# Patient Record
Sex: MALE | Race: WHITE | Employment: UNEMPLOYED | URBAN - METROPOLITAN AREA
[De-identification: names, ages, dates, MRNs, and addresses within clinical notes are randomized per-mention and may not be internally consistent; named-entity substitution may affect disease eponyms.]

---

## 2018-01-16 ENCOUNTER — NURSE ONLY (OUTPATIENT)
Dept: FAMILY MEDICINE CLINIC | Age: 53
End: 2018-01-16

## 2018-01-16 DIAGNOSIS — Z02.83 ENCOUNTER FOR DRUG SCREENING: Primary | ICD-10-CM

## 2018-01-16 LAB

## 2018-01-16 PROCEDURE — WM1004 WORKMED DRUG COLLECTION: Performed by: NURSE PRACTITIONER

## 2023-07-20 ENCOUNTER — APPOINTMENT (OUTPATIENT)
Dept: GENERAL RADIOLOGY | Age: 58
End: 2023-07-20
Payer: COMMERCIAL

## 2023-07-20 ENCOUNTER — APPOINTMENT (OUTPATIENT)
Dept: CT IMAGING | Age: 58
End: 2023-07-20
Payer: COMMERCIAL

## 2023-07-20 ENCOUNTER — HOSPITAL ENCOUNTER (OUTPATIENT)
Age: 58
Setting detail: OBSERVATION
Discharge: HOME | End: 2023-07-21
Attending: EMERGENCY MEDICINE | Admitting: EMERGENCY MEDICINE
Payer: COMMERCIAL

## 2023-07-20 DIAGNOSIS — R55 SYNCOPE AND COLLAPSE: Primary | ICD-10-CM

## 2023-07-20 DIAGNOSIS — R10.9 ABDOMINAL PAIN, UNSPECIFIED ABDOMINAL LOCATION: ICD-10-CM

## 2023-07-20 LAB
ALBUMIN SERPL-MCNC: 4.3 G/DL (ref 3.5–5.2)
ALBUMIN/GLOB SERPL: 1.9 {RATIO} (ref 1–2.5)
ALP SERPL-CCNC: 73 U/L (ref 40–129)
ALT SERPL-CCNC: 24 U/L (ref 5–41)
ANION GAP SERPL CALCULATED.3IONS-SCNC: 13 MMOL/L (ref 9–17)
AST SERPL-CCNC: 30 U/L
BASOPHILS # BLD: 0.03 K/UL (ref 0–0.2)
BASOPHILS NFR BLD: 1 % (ref 0–2)
BILIRUB SERPL-MCNC: 0.6 MG/DL (ref 0.3–1.2)
BUN SERPL-MCNC: 11 MG/DL (ref 8–23)
CALCIUM SERPL-MCNC: 9.2 MG/DL (ref 8.6–10.4)
CHLORIDE SERPL-SCNC: 105 MMOL/L (ref 98–107)
CO2 SERPL-SCNC: 21 MMOL/L (ref 20–31)
CREAT SERPL-MCNC: 0.9 MG/DL (ref 0.7–1.2)
D DIMER PPP FEU-MCNC: 0.39 UG/ML FEU (ref 0–0.57)
EOSINOPHIL # BLD: 0.36 K/UL (ref 0–0.44)
EOSINOPHILS RELATIVE PERCENT: 5 % (ref 1–4)
ERYTHROCYTE [DISTWIDTH] IN BLOOD BY AUTOMATED COUNT: 13.2 % (ref 11.8–14.4)
GFR SERPL CREATININE-BSD FRML MDRD: >60 ML/MIN/1.73M2
GLUCOSE SERPL-MCNC: 115 MG/DL (ref 70–99)
HCT VFR BLD AUTO: 40.9 % (ref 40.7–50.3)
HGB BLD-MCNC: 14.2 G/DL (ref 13–17)
IMM GRANULOCYTES # BLD AUTO: <0.03 K/UL (ref 0–0.3)
IMM GRANULOCYTES NFR BLD: 0 %
LYMPHOCYTES NFR BLD: 2.33 K/UL (ref 1.1–3.7)
LYMPHOCYTES RELATIVE PERCENT: 35 % (ref 24–43)
MCH RBC QN AUTO: 30.5 PG (ref 25.2–33.5)
MCHC RBC AUTO-ENTMCNC: 34.7 G/DL (ref 28.4–34.8)
MCV RBC AUTO: 87.8 FL (ref 82.6–102.9)
MONOCYTES NFR BLD: 0.46 K/UL (ref 0.1–1.2)
MONOCYTES NFR BLD: 7 % (ref 3–12)
NEUTROPHILS NFR BLD: 52 % (ref 36–65)
NEUTS SEG NFR BLD: 3.44 K/UL (ref 1.5–8.1)
NRBC BLD-RTO: 0 PER 100 WBC
PLATELET # BLD AUTO: 207 K/UL (ref 138–453)
PMV BLD AUTO: 9.3 FL (ref 8.1–13.5)
POTASSIUM SERPL-SCNC: 3.9 MMOL/L (ref 3.7–5.3)
PROT SERPL-MCNC: 6.6 G/DL (ref 6.4–8.3)
RBC # BLD AUTO: 4.66 M/UL (ref 4.21–5.77)
SODIUM SERPL-SCNC: 139 MMOL/L (ref 135–144)
TROPONIN I SERPL HS-MCNC: 16 NG/L (ref 0–22)
TROPONIN I SERPL HS-MCNC: 18 NG/L (ref 0–22)
WBC OTHER # BLD: 6.6 K/UL (ref 3.5–11.3)

## 2023-07-20 PROCEDURE — 6370000000 HC RX 637 (ALT 250 FOR IP): Performed by: STUDENT IN AN ORGANIZED HEALTH CARE EDUCATION/TRAINING PROGRAM

## 2023-07-20 PROCEDURE — 2580000003 HC RX 258

## 2023-07-20 PROCEDURE — 2580000003 HC RX 258: Performed by: STUDENT IN AN ORGANIZED HEALTH CARE EDUCATION/TRAINING PROGRAM

## 2023-07-20 PROCEDURE — G0378 HOSPITAL OBSERVATION PER HR: HCPCS

## 2023-07-20 PROCEDURE — 72125 CT NECK SPINE W/O DYE: CPT

## 2023-07-20 PROCEDURE — 70450 CT HEAD/BRAIN W/O DYE: CPT

## 2023-07-20 PROCEDURE — 6370000000 HC RX 637 (ALT 250 FOR IP): Performed by: NURSE PRACTITIONER

## 2023-07-20 PROCEDURE — 99285 EMERGENCY DEPT VISIT HI MDM: CPT

## 2023-07-20 PROCEDURE — 84484 ASSAY OF TROPONIN QUANT: CPT

## 2023-07-20 PROCEDURE — 99252 IP/OBS CONSLTJ NEW/EST SF 35: CPT | Performed by: SURGERY

## 2023-07-20 PROCEDURE — 93005 ELECTROCARDIOGRAM TRACING: CPT

## 2023-07-20 PROCEDURE — 71045 X-RAY EXAM CHEST 1 VIEW: CPT

## 2023-07-20 PROCEDURE — 6360000002 HC RX W HCPCS

## 2023-07-20 PROCEDURE — 6360000004 HC RX CONTRAST MEDICATION: Performed by: EMERGENCY MEDICINE

## 2023-07-20 PROCEDURE — 85027 COMPLETE CBC AUTOMATED: CPT

## 2023-07-20 PROCEDURE — 80053 COMPREHEN METABOLIC PANEL: CPT

## 2023-07-20 PROCEDURE — 85379 FIBRIN DEGRADATION QUANT: CPT

## 2023-07-20 PROCEDURE — 96374 THER/PROPH/DIAG INJ IV PUSH: CPT

## 2023-07-20 PROCEDURE — 71260 CT THORAX DX C+: CPT

## 2023-07-20 RX ORDER — LISINOPRIL 5 MG/1
5 TABLET ORAL DAILY
COMMUNITY

## 2023-07-20 RX ORDER — TIZANIDINE 4 MG/1
4 TABLET ORAL 2 TIMES DAILY PRN
COMMUNITY
Start: 2023-04-25

## 2023-07-20 RX ORDER — ACETAMINOPHEN 325 MG/1
650 TABLET ORAL EVERY 6 HOURS PRN
Status: DISCONTINUED | OUTPATIENT
Start: 2023-07-20 | End: 2023-07-21 | Stop reason: HOSPADM

## 2023-07-20 RX ORDER — TEMAZEPAM 15 MG/1
30 CAPSULE ORAL NIGHTLY
Status: DISCONTINUED | OUTPATIENT
Start: 2023-07-20 | End: 2023-07-21 | Stop reason: HOSPADM

## 2023-07-20 RX ORDER — SODIUM CHLORIDE 0.9 % (FLUSH) 0.9 %
5-40 SYRINGE (ML) INJECTION EVERY 12 HOURS SCHEDULED
Status: DISCONTINUED | OUTPATIENT
Start: 2023-07-20 | End: 2023-07-21 | Stop reason: HOSPADM

## 2023-07-20 RX ORDER — TEMAZEPAM 30 MG/1
30 CAPSULE ORAL NIGHTLY
COMMUNITY
Start: 2023-04-25

## 2023-07-20 RX ORDER — ACETAMINOPHEN 650 MG/1
650 SUPPOSITORY RECTAL EVERY 6 HOURS PRN
Status: DISCONTINUED | OUTPATIENT
Start: 2023-07-20 | End: 2023-07-21 | Stop reason: HOSPADM

## 2023-07-20 RX ORDER — ONDANSETRON 4 MG/1
4 TABLET, ORALLY DISINTEGRATING ORAL EVERY 8 HOURS PRN
Status: DISCONTINUED | OUTPATIENT
Start: 2023-07-20 | End: 2023-07-21 | Stop reason: HOSPADM

## 2023-07-20 RX ORDER — 0.9 % SODIUM CHLORIDE 0.9 %
1000 INTRAVENOUS SOLUTION INTRAVENOUS ONCE
Status: COMPLETED | OUTPATIENT
Start: 2023-07-20 | End: 2023-07-20

## 2023-07-20 RX ORDER — SODIUM CHLORIDE 0.9 % (FLUSH) 0.9 %
5-40 SYRINGE (ML) INJECTION PRN
Status: DISCONTINUED | OUTPATIENT
Start: 2023-07-20 | End: 2023-07-21 | Stop reason: HOSPADM

## 2023-07-20 RX ORDER — SODIUM CHLORIDE 9 MG/ML
INJECTION, SOLUTION INTRAVENOUS PRN
Status: DISCONTINUED | OUTPATIENT
Start: 2023-07-20 | End: 2023-07-21 | Stop reason: HOSPADM

## 2023-07-20 RX ORDER — POLYETHYLENE GLYCOL 3350 17 G/17G
17 POWDER, FOR SOLUTION ORAL DAILY PRN
Status: DISCONTINUED | OUTPATIENT
Start: 2023-07-20 | End: 2023-07-21 | Stop reason: HOSPADM

## 2023-07-20 RX ORDER — TIZANIDINE 4 MG/1
4 TABLET ORAL 2 TIMES DAILY PRN
Status: DISCONTINUED | OUTPATIENT
Start: 2023-07-20 | End: 2023-07-21 | Stop reason: HOSPADM

## 2023-07-20 RX ORDER — ENOXAPARIN SODIUM 100 MG/ML
30 INJECTION SUBCUTANEOUS 2 TIMES DAILY
Status: DISCONTINUED | OUTPATIENT
Start: 2023-07-21 | End: 2023-07-21 | Stop reason: HOSPADM

## 2023-07-20 RX ORDER — ACETAMINOPHEN 500 MG
1000 TABLET ORAL ONCE
Status: COMPLETED | OUTPATIENT
Start: 2023-07-20 | End: 2023-07-20

## 2023-07-20 RX ORDER — FENTANYL CITRATE 50 UG/ML
25 INJECTION, SOLUTION INTRAMUSCULAR; INTRAVENOUS ONCE
Status: COMPLETED | OUTPATIENT
Start: 2023-07-20 | End: 2023-07-20

## 2023-07-20 RX ORDER — ONDANSETRON 2 MG/ML
4 INJECTION INTRAMUSCULAR; INTRAVENOUS EVERY 6 HOURS PRN
Status: DISCONTINUED | OUTPATIENT
Start: 2023-07-20 | End: 2023-07-21 | Stop reason: HOSPADM

## 2023-07-20 RX ORDER — LISINOPRIL 10 MG/1
5 TABLET ORAL DAILY
Status: DISCONTINUED | OUTPATIENT
Start: 2023-07-20 | End: 2023-07-21 | Stop reason: HOSPADM

## 2023-07-20 RX ORDER — ATORVASTATIN CALCIUM 20 MG/1
20 TABLET, FILM COATED ORAL NIGHTLY
Status: DISCONTINUED | OUTPATIENT
Start: 2023-07-20 | End: 2023-07-21 | Stop reason: HOSPADM

## 2023-07-20 RX ORDER — LISINOPRIL 20 MG/1
20 TABLET ORAL DAILY
Status: ON HOLD | COMMUNITY
End: 2023-07-20

## 2023-07-20 RX ORDER — ATORVASTATIN CALCIUM 20 MG/1
20 TABLET, FILM COATED ORAL NIGHTLY
COMMUNITY
Start: 2023-04-25

## 2023-07-20 RX ADMIN — ATORVASTATIN CALCIUM 20 MG: 20 TABLET, FILM COATED ORAL at 19:50

## 2023-07-20 RX ADMIN — ACETAMINOPHEN 650 MG: 325 TABLET ORAL at 19:54

## 2023-07-20 RX ADMIN — IOPAMIDOL 75 ML: 755 INJECTION, SOLUTION INTRAVENOUS at 11:31

## 2023-07-20 RX ADMIN — SODIUM CHLORIDE 1000 ML: 9 INJECTION, SOLUTION INTRAVENOUS at 06:32

## 2023-07-20 RX ADMIN — SODIUM CHLORIDE, PRESERVATIVE FREE 10 ML: 5 INJECTION INTRAVENOUS at 19:50

## 2023-07-20 RX ADMIN — ACETAMINOPHEN 1000 MG: 500 TABLET ORAL at 08:30

## 2023-07-20 RX ADMIN — TEMAZEPAM 30 MG: 15 CAPSULE ORAL at 22:29

## 2023-07-20 RX ADMIN — FENTANYL CITRATE 25 MCG: 50 INJECTION, SOLUTION INTRAMUSCULAR; INTRAVENOUS at 06:48

## 2023-07-20 RX ADMIN — SODIUM CHLORIDE, PRESERVATIVE FREE 10 ML: 5 INJECTION INTRAVENOUS at 22:30

## 2023-07-20 ASSESSMENT — PAIN SCALES - GENERAL
PAINLEVEL_OUTOF10: 5
PAINLEVEL_OUTOF10: 7
PAINLEVEL_OUTOF10: 7
PAINLEVEL_OUTOF10: 5

## 2023-07-20 ASSESSMENT — ENCOUNTER SYMPTOMS
COUGH: 0
RECTAL PAIN: 1
BACK PAIN: 1
SORE THROAT: 0
BACK PAIN: 0
DIARRHEA: 0
ANAL BLEEDING: 0
NAUSEA: 1
NAUSEA: 0
CHEST TIGHTNESS: 0
ABDOMINAL DISTENTION: 0
VOMITING: 0
TROUBLE SWALLOWING: 0
ABDOMINAL PAIN: 0
SHORTNESS OF BREATH: 0
ABDOMINAL PAIN: 1
BLOOD IN STOOL: 0
WHEEZING: 0
CONSTIPATION: 0

## 2023-07-20 ASSESSMENT — PAIN DESCRIPTION - LOCATION
LOCATION: BACK
LOCATION: FACE;NECK

## 2023-07-20 ASSESSMENT — HEART SCORE: ECG: 1

## 2023-07-20 NOTE — ED NOTES
Labeled blood specimens sent to lab via tube system.     [] Lavender   [] on ice   [] Blue   [x] Green/yellow  [] Green/black [] on ice  [] Pink  [] Red  [] Yellow  [] on ice  [] Blood Cultures      Almita Willams RN  07/20/23 6986

## 2023-07-20 NOTE — ED NOTES
Dr. Huy Ugalde at bedside to reassess pt.      Khalida Berger, RN  07/20/23 [facility], RN  07/20/23 6543

## 2023-07-20 NOTE — PROGRESS NOTES
Orthostatic blood pressures and pulses. Lying  BP: 130/33  Pulse: 58    Sitting  BP: 144/83  Pulse: 70    Standing  BP:  137/80  Pulse: 70    Pt denies any dizziness, faintness or blurred vision. States he feels good.

## 2023-07-20 NOTE — PROGRESS NOTES
[address]     Emergency/Trauma Note    PATIENT NAME: Fernando Murphy    Shift date: 07/20/2023  Shift day: Friday   Shift # 1    Room # 16/16     Name: Fernando Murphy            Age: 59 y.o. Gender: male          Gnosticist: [address] of S[employer]:     Trauma/Incident type: Adult Trauma Consult  Admit Date & Time: 7/20/2023  5:45 AM  TRAUMA NAME: None    ADVANCE DIRECTIVES IN CHART? No    PATIENT/EVENT DESCRIPTION:  Fernando Murphy is a 59 y.o. male who arrived ED as adult trauma consult. Patient was awake and alert when  visited. Patient to be admitted to 16/16. SPIRITUAL ASSESSMENT-INTERVENTION-OUTCOME:  Patient was raised Jewish and very receptive to pastoral presence. However, patient declined anointing of the sick. Family was present at the time.  maintained listening presence, offered support and prayed with patient and family. Patient and family expressed gratitude for the spiritual and emotional support they received. PATIENT BELONGINGS:  No belongings noted    ANY BELONGINGS OF SIGNIFICANT VALUE NOTED:  Unknown    REGISTRATION STAFF NOTIFIED? Yes    WHAT IS YOUR SPIRITUAL CARE PLAN FOR THIS PATIENT?:  Follow up visits recommended for more prayers and support. Electronically signed by Fr. Ruby Tejeda on 7/20/2023 at 12:11 PM.  [address] [place]  [phone]

## 2023-07-20 NOTE — ED NOTES
Patient presents to ED via triage via EMS with reports of a syncopal episode that occurred earlier this morning. Patient states he was using the bathroom and bared down and said he woke up to his wife saying he passed out. LOC x3min according to wife. Patient states he feels like he hit his head and also reports neck and shoulder pain rating 7/10. Reports numbness and tingling in the left hand but also states that feeling has subsided at this time. Patient also reports this happened a few years ago. Patient placed in c collar by EMS, alert and orientedx4.       Simon Richardson RN  07/20/23 6919

## 2023-07-20 NOTE — ED PROVIDER NOTES
[address] [place] [address] ED  Emergency Department  Emergency Medicine Resident Sign-out     Care of Saira Neal was assumed from Dr. Isaías Seth and is being seen for Loss of Consciousness  . The patient's initial evaluation and plan have been discussed with the prior provider who initially evaluated the patient. EMERGENCY DEPARTMENT COURSE / MEDICAL DECISION MAKING:       MEDICATIONS GIVEN:  Orders Placed This Encounter   Medications    0.9 % sodium chloride bolus    fentaNYL (SUBLIMAZE) injection 25 mcg       LABS / RADIOLOGY:     Labs Reviewed   CBC WITH AUTO DIFFERENTIAL - Abnormal; Notable for the following components:       Result Value    Eosinophils % 5 (*)     All other components within normal limits   COMPREHENSIVE METABOLIC PANEL - Abnormal; Notable for the following components:    Glucose 115 (*)     All other components within normal limits   TROPONIN   D-DIMER, QUANTITATIVE   TROPONIN       CT HEAD WO CONTRAST    Result Date: 7/20/2023  EXAMINATION: CT OF THE HEAD WITHOUT CONTRAST  7/20/2023 6:28 am TECHNIQUE: CT of the head was performed without the administration of intravenous contrast. Automated exposure control, iterative reconstruction, and/or weight based adjustment of the mA/kV was utilized to reduce the radiation dose to as low as reasonably achievable. COMPARISON: None. HISTORY: ORDERING SYSTEM PROVIDED HISTORY: Fall, LOC TECHNOLOGIST PROVIDED HISTORY: Fall, LOC Decision Support Exception - unselect if not a suspected or confirmed emergency medical condition->Emergency Medical Condition (MA) FINDINGS: BRAIN/VENTRICLES: There is no acute intracranial hemorrhage, mass effect or midline shift. No abnormal extra-axial fluid collection. The gray-white differentiation is maintained without evidence of an acute infarct. There is no evidence of hydrocephalus. Mild diffuse cerebral atrophy. ORBITS: The visualized portion of the orbits demonstrate no acute abnormality.  SINUSES: The visualized

## 2023-07-20 NOTE — CONSULTS
TRAUMA H&P/CONSULT    PATIENT NAME: Suellen Mathias  YOB: 1965  MEDICAL RECORD NO. [id number]   DATE: 7/20/2023  PRIMARY CARE PHYSICIAN: Chris Brooks MD  PATIENT EVALUATED AT THE REQUEST OF : Lakisha Vale    ACTIVATION   []Trauma Alert     [] Trauma Priority     [x]Trauma Consult. There is no problem list on file for this patient. IMPRESSION AND PLAN:       Syncope without traumatic injury  - Dispo per ED pending CTAP     Rectal pain  - Patient had a colonoscopy in 2017 that noted a mass, there has not since been a repeat. - Recommend repeat, consider GI consult     [address]    [] Neurosurgery     [] Orthopedic Surgery    [] Cardiothoracic     [] Facial Trauma    [] Plastic Surgery (Burn)    [] Pediatric Surgery     [] Internal Medicine    [] Pulmonary Medicine    [] Geriatrics    [] Other:        HISTORY:     Chief Complaint:  \"I passed out\"    GENERAL DATA  Patient information was obtained from patient. History/Exam limitations: none. Injury Date: 7/20/23   Approximate Injury Time: morning        Transport mode:   []Ambulance      [] Helicopter     [x]Car       [] Other  Referring Hospital: [address] [place] [address]   Location (e.g., home, farm, industry, street): home  Specific Details of Location (e.g., bedroom, kitchen, garage, highway): bedroom/bathroom    MECHANISM OF INJURY  [x] Fall    [x]From Standing     []From Height __ Ft     []Down ___steps  []Other___    HISTORY:     Suellen Mathias is a 58 yo male that presented to the Emergency Department following syncopal episode at home. The patient states that he woke up early AM and felt an intense pain in his low abdomen and rectum. He described is as pain and the sensation that he was about to have a bowel movement or emesis. He got up to go use the toilet but could not have a BM, so he was walking back to his bed and the pain became so intense that he passed out.  His wife sated that he was down for about 3 minutes and

## 2023-07-20 NOTE — H&P
[address]  U / [address] [facility] NOTE     Pt Name: Juanis Edwards  MRN: [id number]  [address] [facility] 1965  Date of evaluation: 7/20/23  Patient's PCP is :  Darlene Joseph MD    [address]       Chief Complaint   Patient presents with    Loss of Consciousness         HISTORY OF PRESENT ILLNESS    Juanis Edwards is a 59 y.o. male who presents after a syncopal episode follow-up after returning from the restroom. Patient spouse found him laying on the floor. Patient states that this is happened several times in the past where he gets excruciating abdominal and rectal discomfort and then has a syncopal episode lasting up to approximately 3 minutes. Patient states he has had GI evaluations in the past which have been unremarkable. He currently denies headache, dizziness, nausea, vomiting, chest pain shortness of breath    Location/Symptom: Syncopal episode  Timing/Onset: Prior to arrival  Provocation: Unknown  Quality: n/a  Radiation: n/a  Severity: n/a  Timing/Duration: Episodic  Modifying Factors: n/q    REVIEW OF SYSTEMS       Review of Systems   Constitutional:  Negative for chills, fatigue and fever. Respiratory:  Negative for cough and shortness of breath. Cardiovascular:  Negative for chest pain. Gastrointestinal:  Positive for abdominal pain and rectal pain. Negative for constipation and diarrhea. Genitourinary:  Negative for difficulty urinating. Musculoskeletal:  Negative for arthralgias, back pain and myalgias. Neurological:  Positive for syncope. Negative for dizziness and headaches. All other systems reviewed and are negative. (PQRS) Advance directives on face sheet per hospital policy. No change unless specifically mentioned in chart    PAST MEDICAL HISTORY    has no past medical history on file. I have reviewed the past medical history with the patient and it is pertinent to this complaint.       SURGICAL HISTORY      has no past surgical history

## 2023-07-20 NOTE — ED PROVIDER NOTES
[address] ED  Emergency Department Encounter  Emergency Medicine Resident     Pt Name:Jed Don  MRN: [id number]  [address] [facility] 1965  Date of evaluation: 7/20/23  PCP:  Amaris Alejandra MD  Note Started: 5:48 AM EDT      CHIEF COMPLAINT       Chief Complaint   Patient presents with    Loss of Consciousness       HISTORY OF PRESENT ILLNESS  (Location/Symptom, Timing/Onset, Context/Setting, Quality, Duration, Modifying Factors, Severity.)      Eneida Palma is a 59 y.o. male who presents with syncopal event. Patient states that he was sitting on the toilet and had an episode of feeling flushed, dizzy and woke up on the ground. This syncopal event was witnessed, states that he was unconscious for approximately 3 minutes. Patient states he did hit his head, is complaining of headache, neck pain. Patient states that he has had frequent episodes of syncope states the last episode is approximately 6 months ago. Denies associated chest pain, shortness of breath, fever, chills, nausea, vomiting, abdominal pain. PAST MEDICAL / SURGICAL / SOCIAL / FAMILY HISTORY      has no past medical history on file. has no past surgical history on file.       Social History     Socioeconomic History    Marital status:      Spouse name: Not on file    Number of children: Not on file    Years of education: Not on file    Highest education level: Not on file   Occupational History    Not on file   Tobacco Use    Smoking status: Not on file    Smokeless tobacco: Not on file   Substance and Sexual Activity    Alcohol use: Not on file    Drug use: Not on file    Sexual activity: Not on file   Other Topics Concern    Not on file   Social History Narrative    Not on file     Social Determinants of Health     Financial Resource Strain: Not on file   Food Insecurity: Not on file   Transportation Needs: Not on file   Physical Activity: Not on file   Stress: Not on file   Social Connections: Not on file RESULTS / EMERGENCY DEPARTMENT COURSE / MDM     Medical Decision Making  66-year-old male presents to the emergency department after syncopal event. States the syncopal event happened after he was on the toilet, went to stand up and lost consciousness. Event was witnessed, states he lost consciousness for approximately 3 minutes. States he did hit his head and is complaining of head and neck pain. States he has had a few of these episodes in the past few months and years. Differential diagnosis: ACS/STEMI/NSTEMI, PE, cardiac arrhythmia, low glucose, anemia, SAH, dissection, AAA, seizure, vasovagal, orthostatic  In order to evaluate this patient's symptoms will obtain CBC, BMP, troponin x2, D-dimer, EKG, chest x-ray, CT head and CT cervical        Amount and/or Complexity of Data Reviewed  Labs: ordered. Radiology: ordered. ECG/medicine tests: ordered. Risk  Prescription drug management. EKG  Normal sinus rhythm, ventricular rate 80, QTc 449, normal axis, no acute ST changes    All EKG's are interpreted by the Emergency Department Physician who either signs or Co-signs this chart in the absence of a cardiologist.    EMERGENCY DEPARTMENT COURSE:      ED Course as of 23 0736   Thu 2023   0734 History: 1  EC  Patient Age: 1  Risk Factors: 2  Troponin: 0  Heart Score Total: 5   [MW]   0734 Patient's work-up thus far grossly unremarkable, D-dimer negative which makes PE less likely. First troponin was within normal limits, waiting on second set of troponins. [MW]   0754 Pending second normal troponin, most likely admission to the observation unit for cardiac evaluation. [MW]   1542 Patient signed out to oncoming resident. [MW]      ED Course User Index  [MW] Jerri Olmos MD       PROCEDURES:      CONSULTS:  None    CRITICAL CARE:  There was significant risk of life threatening deterioration of patient's condition requiring my direct management.  Critical care time 0 minutes, excluding

## 2023-07-21 VITALS
OXYGEN SATURATION: 99 % | HEART RATE: 60 BPM | TEMPERATURE: 97.3 F | RESPIRATION RATE: 16 BRPM | SYSTOLIC BLOOD PRESSURE: 135 MMHG | WEIGHT: 229.72 LBS | BODY MASS INDEX: 28.56 KG/M2 | HEIGHT: 75 IN | DIASTOLIC BLOOD PRESSURE: 84 MMHG

## 2023-07-21 LAB
EKG ATRIAL RATE: 68 BPM
EKG ATRIAL RATE: 80 BPM
EKG P AXIS: 37 DEGREES
EKG P-R INTERVAL: 172 MS
EKG P-R INTERVAL: 176 MS
EKG Q-T INTERVAL: 390 MS
EKG Q-T INTERVAL: 410 MS
EKG QRS DURATION: 100 MS
EKG QRS DURATION: 98 MS
EKG QTC CALCULATION (BAZETT): 435 MS
EKG QTC CALCULATION (BAZETT): 449 MS
EKG R AXIS: 38 DEGREES
EKG R AXIS: 54 DEGREES
EKG T AXIS: 50 DEGREES
EKG T AXIS: 8 DEGREES
EKG VENTRICULAR RATE: 68 BPM
EKG VENTRICULAR RATE: 80 BPM

## 2023-07-21 PROCEDURE — 6370000000 HC RX 637 (ALT 250 FOR IP): Performed by: NURSE PRACTITIONER

## 2023-07-21 PROCEDURE — 2580000003 HC RX 258: Performed by: STUDENT IN AN ORGANIZED HEALTH CARE EDUCATION/TRAINING PROGRAM

## 2023-07-21 PROCEDURE — G0378 HOSPITAL OBSERVATION PER HR: HCPCS

## 2023-07-21 PROCEDURE — 93005 ELECTROCARDIOGRAM TRACING: CPT | Performed by: EMERGENCY MEDICINE

## 2023-07-21 RX ADMIN — SODIUM CHLORIDE, PRESERVATIVE FREE 10 ML: 5 INJECTION INTRAVENOUS at 10:06

## 2023-07-21 RX ADMIN — LISINOPRIL 5 MG: 10 TABLET ORAL at 10:05

## 2023-07-21 NOTE — CONSULTS
Attestation signed by      Attending Physician Statement:    I have discussed the care of  Susanne Hernandez , including pertinent history and exam findings, with the Cardiology fellow/resident. I have seen and examined the patient and the key elements of all parts of the encounter have been performed by me. I agree with the assessment, plan and orders as documented by the fellow/resident, after I modified exam findings and plan of treatments, and the final version is my approved version of the assessment. Additional Comments: Syncope after sudden urge to have BM. This happens every few years and had GI evaluation. This is vasovagal phenomenon. Orthostatic negative. Patient is very active at baseline and denies any chest pain or dyspnea. Ok to discharge from cardiology perspective. Wander Fagan MD         [facility] Cardiology Cardiology    Consult / H&P               Today's Date: 7/21/2023  Patient Name: Susanne Hernandez  Date of admission: 7/20/2023  5:45 AM  Patient's age: 59 y.o., 1965  Admission Dx: Syncope and collapse [R55]    Reason for Consult:  Cardiac evaluation    Requesting Physician: Samantha Romero MD    CHIEF COMPLAINT:  Syncope    History Obtained From:  patient    HISTORY OF PRESENT ILLNESS:      The patient is a 59 y.o.  male who is admitted to the hospital for Syncope. Patient reports having a syncopal episode while getting up from sitting position when he was in the toilet with LOC for approximately 3 mins which was witnessed by his wife. He denies Chest pain, palpitations and shortness of breath. He had similar episode 3 years ago. He is known hypertensive, alcoholic drinks 6 beers per day. His EKG is normal  His Troponin, D-dimer are normal.  He is taking Lisinopril-5 mg, Atorvastatin-20 mg. Past Medical History:   has no past medical history on file. Past Surgical History:   has no past surgical history on file.      Home Medications:    Prior to Admission

## 2023-07-23 NOTE — DISCHARGE SUMMARY
Discharge teaching and instructions completed with patient using teachback method. AVS reviewed; diagnosis education added to AVS packet. No new prescriptions given to patient. Patient voiced understanding regarding home medications, follow up appointments, and care of self at home. Discharged home with family. All questions answered.
Currently there is about 30% decrease height. DEGENERATIVE CHANGES: Multilevel vertebral body osteophytes in the thoracic spine. L4-5 moderate to severe disc space narrowing with vacuum disc changes and endplate sclerosis. Small lumbar spine osteophytes. SOFT TISSUES: There is no prevertebral soft tissue swelling. CHEST ABDOMEN PELVIS: 1. No evidence for acute visceral injury. 2. No acute osseous abnormality. 3. No acute infective or inflammatory process. THORACIC AND LUMBAR SPINE: 1. No acute fracture. 2. Remote L2 fracture which was acute in 2014. Interval L2 kyphoplasty. Currently about 30% decrease height of L2. 3. Moderate degenerative changes in the spine. Physical Exam:    General appearance - NAD, AOx 3    Lungs -CTAB, no R/R/R  Heart - RRR, no M/R/G  Abdomen - Soft, NT/ND  Neurological:  MAEx4, No focal motor deficit, sensory loss  Extremities - Cap refil <2 sec in all ext., no edema  Skin -warm, dry      Hospital Course:  Clinical course has improved, labs and imaging reviewed. Kim Anthony originally presented to the hospital on 7/20/2023  5:45 AM with syncopal episode after standing up from the toilet and during which time he was experiencing significant abdominal pain. At that time it was determined that He required further observation and cardiology evaluation. Patient improved in the next day. Patient had a EKG and enzymes done. Patient seen by attending cardiologist. Ariana Baker and imaging were followed daily. Imaging results as above. He is medically stable to be discharged. Disposition: Home    Patient stated that they will not drive themselves home from the hospital if they have gotten pain killers/ narcotics earlier that day and that they will arrange for transportation on their own or work with the  for a ride. Patient counseled NOT to drive while under the influence of narcotics/ pain killers.      Condition: Good    Patient stable and ready for discharge

## 2023-07-23 NOTE — PROGRESS NOTES
[address]  CDU / OBSERVATION ENCOUNTER  ATTENDING NOTE       I performed a history and physical examination of the patient and discussed management with the resident or midlevel provider. I reviewed the resident or midlevel provider's note and agree with the documented findings and plan of care. Any areas of disagreement are noted on the chart. I was personally present for the key portions of any procedures. I have documented in the chart those procedures where I was not present during the key portions. I have reviewed the nurses notes. I agree with the chief complaint, past medical history, past surgical history, allergies, medications, social and family history as documented unless otherwise noted below. The Family history, social history, and ROS are effectively unchanged since admission unless noted elsewhere in the chart. This patient was placed in the observation unit for reevaluation for possible admission to the hospital    Patient evaluated for syncopal event. Patient had syncope after standing up from the toilet where he was experiencing significant abdominal pain and cramping. Patient improved and ED. Patient's had similar episode previously. Patient evaluated by cardiology. Patient at baseline now. Patient felt to be at baseline level after vasovagal event. Patient for does not require further cardiac work-up at this time after negative EKG and enzymes. Discussed with patient at some length his symptoms. Feel that his abdominal pain was possibly due to constipation. Spent some time discussing case with patient and preventative measures. Patient for further follow-up as outpatient.     Cole Benjamin MD  Attending Emergency  Physician